# Patient Record
Sex: MALE | Race: WHITE
[De-identification: names, ages, dates, MRNs, and addresses within clinical notes are randomized per-mention and may not be internally consistent; named-entity substitution may affect disease eponyms.]

---

## 2018-07-13 NOTE — EDPHYS
Physician Documentation                                                                           

 [facility]                                                                

Name: Narayan Lutz                                                                              

Age: 23 yrs                                                                                       

Sex: Male                                                                                         

: 1995                                                                                   

MRN: [id number]                                                                                   

Arrival Date: 2018                                                                          

Time: 16:33                                                                                       

Account#: [id number]                                                                            

Bed 20                                                                                            

Private MD: Out, [facility]                                                                    

ED Physician Josiah Crain                                                                       

HPI:                                                                                              

                                                                                             

16:52 This 23 yrs old  Male presents to ER via Ambulatory with complaints of Swollen kav 

      Glands, Hand Swelling.                                                                      

17:06 The patient presents with sore throat, dysphagia, of solids. The patient describes      kav 

      throat pain as burning. Onset: The symptoms/episode began/occurred acutely, 2 day(s)        

      ago. The patient or guardian reports pain, swelling. The complaints affect the PIP of       

      left index finger. Context: The problem was sustained at home, resulted from an unknown     

      cause. Severity of symptoms: At their worst the symptoms were moderate, just prior to       

      arrival, in the emergency department the symptoms a " 6" out of "10". Associated signs      

      and symptoms:. patient presents with swollen, warm, erythema left hand of the PIP left      

      index finger. he reports that his friends have been squeezing it trying to expel            

      pustular discharge. this area is very tender to touch. he also c/o of fever/chills,         

      sore throat and swollen lymph node left cervical and reports that he has been unable to     

      swallow solid foods for the past 2 days owing to sore throat.                               

                                                                                                  

Historical:                                                                                       

- Allergies:                                                                                      

16:50 No Known Allergies;                                                                     sg  

- Home Meds:                                                                                      

16:50 None [Active];                                                                          sg  

- PMHx:                                                                                           

16:50 None;                                                                                   sg  

- PSHx:                                                                                           

16:50 Tonsillectomy;                                                                          sg  

                                                                                                  

- Immunization history:: Adult Immunizations up to date, Last tetanus immunization: up            

  to date.                                                                                        

- Social history:: Smoking status: Patient uses tobacco products, denies chronic                  

  smoking, but will smoke occasionally.                                                           

- Ebola Screening: : Patient negative for fever greater than or equal to 101.5 degrees            

  Fahrenheit, and additional compatible Ebola Virus Disease symptoms Patient denies               

  exposure to infectious person Patient denies travel to an Ebola-affected area in the            

  21 days before illness onset No symptoms or risks identified at this time.                      

- Family history:: not pertinent.                                                                 

- Hospitalizations: : No recent hospitalization is reported.                                      

- History obtained from: friend.                                                                  

                                                                                                  

                                                                                                  

ROS:                                                                                              

17:13 Eyes: Negative for injury, pain, redness, and discharge, Cardiovascular: Negative for   kav 

      chest pain, palpitations, and edema, Respiratory: Negative for shortness of breath,         

      cough, wheezing, and pleuritic chest pain, Abdomen/GI: Negative for abdominal pain,         

      nausea, vomiting, diarrhea, and constipation, Back: Negative for injury and pain, :       

      Negative for injury, bleeding, discharge, and swelling, MS/Extremity: Negative for          

      injury and deformity, Neuro: Negative for headache, weakness, numbness, tingling, and       

      seizure, Psych: Negative for depression, anxiety, suicide ideation, homicidal ideation,     

      and hallucinations, Allergy/Immunology: Negative for hives, rash, and allergies,            

      Endocrine: Negative for neck swelling, polydipsia, polyuria, polyphagia, and marked         

      weight changes, Hematologic/Lymphatic: Negative for swollen nodes, abnormal bleeding,       

      and unusual bruising.                                                                       

17:13 Constitutional: Positive for chills, fever, poor PO intake, Negative for body aches,        

      weight loss.                                                                                

17:13 ENT: Positive for of the left aspect of posterior pharynx, sinus congestion, sore           

      throat.                                                                                     

17:13 Skin: Positive for abscess, cellulitis, erythema, swelling, of the PIP of left index        

      finger.                                                                                     

                                                                                                  

Exam:                                                                                             

17:13 Head/Face:  Normocephalic, atraumatic. Eyes:  Pupils equal round and reactive to light, kav 

      extra-ocular motions intact.  Lids and lashes normal.  Conjunctiva and sclera are           

      non-icteric and not injected.  Cornea within normal limits.  Periorbital areas with no      

      swelling, redness, or edema. Chest/axilla:  Normal chest wall appearance and motion.        

      Nontender with no deformity.  No lesions are appreciated. Cardiovascular:  Regular rate     

      and rhythm with a normal S1 and S2.  No gallops, murmurs, or rubs.  Normal PMI, no JVD.     

       No pulse deficits. Respiratory:  Lungs have equal breath sounds bilaterally, clear to      

      auscultation and percussion.  No rales, rhonchi or wheezes noted.  No increased work of     

      breathing, no retractions or nasal flaring. Abdomen/GI:  Soft, non-tender, with normal      

      bowel sounds.  No distension or tympany.  No guarding or rebound.  No evidence of           

      tenderness throughout. Back:  No spinal tenderness.  No costovertebral tenderness.          

      Full range of motion. MS/ Extremity:  Pulses equal, no cyanosis.  Neurovascular intact.     

       Full, normal range of motion. Neuro:  Awake and alert, GCS 15, oriented to person,         

      place, time, and situation.  Cranial nerves II-XII grossly intact.  Motor strength 5/5      

      in all extremities.  Sensory grossly intact.  Cerebellar exam normal.  Normal gait.         

      Psych:  Awake, alert, with orientation to person, place and time.  Behavior, mood, and      

      affect are within normal limits.                                                            

17:13 Constitutional: The patient appears in no acute distress, alert, awake,                     

      non-diaphoretic, non-toxic, well developed, well hydrated, well groomed, well               

      nourished, febrile, in obvious distress, uncomfortable.                                     

17:13 ENT: Posterior pharynx: erythema, that is moderate, blisters.                               

17:13 Neck: Lymph nodes: lymphadenopathy is appreciated, posterior cervical nodes, left.          

17:13 Skin: abscess, that is moderate sized, approximately  2 cm(s), with surrounding             

      cellulitis, that is moderate, cellulitis, that is moderate, well demarcated, on the         

      PIP of left index finger.                                                                   

                                                                                                  

Vital Signs:                                                                                      

16:50  / 100; Pulse 102; Resp 17; Temp 99.8; Pulse Ox 99% on R/A; Weight 61.23 kg (R);  sg  

      Pain 8/10;                                                                                  

18:23  / 61; Pulse 76; Resp 16; Pulse Ox 99% on R/A; Pain 3/10;                         em  

                                                                                                  

Procedures:                                                                                       

17:13 I \T\ D: Incision and drainage was performed for an abscess of the left PIP of left index kav

      finger Prepped with Betadine, Anesthetized with 3 ml's 1% Lidocaine. Incised with #11       

      blade. Drained small amount Dressing: non-Adherent dressing, the patient tolerated the      

      procedure well.                                                                             

                                                                                                  

MDM:                                                                                              

17:05 Medical screening is not applicable.                                                    [facility] 

17:13 Data reviewed: vital signs, nurses notes, lab test result(s).                            

18:05 Data reviewed: lab test result(s), strep positive.                                      [facility] 

                                                                                                  

                                                                                             

17:05 Order name: Strep: left side of throat; Complete Time: 18:02                            [facility] 

                                                                                             

17:05 Order name: CBC with Diff; Complete Time: 18:02                                         [facility] 

                                                                                             

17:05 Order name: CMP; Complete Time: 18:04                                                   [facility] 

                                                                                             

17:19 Order name: Wound Culture                                                               kav 

                                                                                             

17:05 Order name: I\T\D Setup; Complete Time: 18:38                                             kav

                                                                                                  

Administered Medications:                                                                         

17:38 Drug: Rocephin 1 grams Route: IV; Rate: bolus; Site: right antecubital;                 aa5 

18:24 Follow up: Response: No adverse reaction; IV Status: Completed infusion; IV Intake: 10mlem  

17:38 Drug: TORadol 30 mg Route: IVP; Site: right antecubital;                                aa5 

18:24 Follow up: Response: No adverse reaction; Pain is decreased                             em  

17:44 Drug: NS 0.9% 1000 ml Route: IV; Rate: 1 bolus; Site: right antecubital;                em  

17:55 Drug: Lidocaine (1 %) 5 mg {Note: administered by ESTELLA Bauman} Route: Infiltration;      em  

      Site: wound;                                                                                

18:24 Follow up: Response: No adverse reaction                                                em  

                                                                                                  

                                                                                                  

Disposition:                                                                                      

18 18:06 Discharged to Home. Impression: Oral Aphthous Ulcers, Cellulitis and Abscess of    

  Left Hand PIP Index Finger, Streptococcal pharyngitis.                                          

- Condition is Stable.                                                                            

- Discharge Instructions: Pharyngitis, Strep Throat, Abscess, Easy-to-Read, Cellulitis,           

  Easy-to-Read.                                                                                   

- Prescriptions for mupirocin 2 % Topical ointment - apply 1 application by TOPICAL               

  route 3 times per day; 1 tube. Amoxicillin 875 mg Oral Tablet - take 1 tablet by ORAL           

  route every 12 hours for 10 days; 20 tablet. Ibuprofen 800 mg Oral Tablet - take 1              

  tablet by ORAL route every 8 hours As needed take with food; 30 tablet. Tylenol-                

  Codeine #3 300-30 mg Oral Tablet - take 2 tablet by ORAL route every 6 hours As                 

  needed; 30 tablet. Doxycycline Hyclate 100 mg Oral Tablet - take 1 tablet by ORAL               

  route every 12 hours; 20 tablet.                                                                

- Work release form, Medication Reconciliation Form, Thank You Letter, Antibiotic                 

  Education, Prescription Opioid Use form.                                                        

- Follow up: Private Physician; When: 2 - 3 days; Reason: Recheck today's complaints,             

  Continuance of care, Re-evaluation by your physician.                                           

- Problem is new.                                                                                 

- Symptoms have improved.                                                                         

                                                                                                  

                                                                                                  

                                                                                                  

Addendum:                                                                                         

07/15/2018                                                                                        

     20:35 Co-signature as Attending Physician, Josiah Crain MD I agree with the assessment and   k
dr

           plan of care.                                                                          

                                                                                                  

Signatures:                                                                                       

Dispatcher [employer]                           Tab Montanez, RN                         RN   sg                                                   

Josiah Crain MD MD kdr Vern, Katherine, FNP                    FNP  Richmond Carter, LVN                       LVN  Kristin Moon, RN                     RN   aa5                                                  

                                                                                                  

Corrections: (The following items were deleted from the chart)                                    

                                                                                             

18:46 18:06 2018 18:06 Discharged to Home. Impression: Oral Aphthous Ulcers; Cellulitis em  

      and Abscess of Left Hand PIP Index Finger; Streptococcal pharyngitis. Condition is          

      Stable. Discharge Instructions: Abscess, Easy-to-Read, Cellulitis, Easy-to-Read.            

      Prescriptions for mupirocin 2 % Topical ointment - apply 1 application by TOPICAL route     

      3 times per day; 1 tube, Amoxicillin 875 mg Oral Tablet - take 1 tablet by ORAL route       

      every 12 hours for 10 days; 20 tablet, Ibuprofen 800 mg Oral Tablet - take 1 tablet by      

      ORAL route every 8 hours As needed take with food; 30 tablet, Tylenol-Codeine #3 300-30     

      mg Oral Tablet - take 2 tablet by ORAL route every 6 hours As needed; 30 tablet,            

      Doxycycline Hyclate 100 mg Oral Tablet - take 1 tablet by ORAL route every 12 hours; 20     

      tablet. and Forms are Medication Reconciliation Form, Thank You Letter, Antibiotic          

      Education, Prescription Opioid Use. Follow up: Private Physician; When: 2 - 3 days;         

      Reason: Recheck today's complaints, Continuance of care, Re-evaluation by your              

      physician. Problem is new. Symptoms have improved. kav                                      

                                                                                                  

**************************************************************************************************

## 2018-07-13 NOTE — ER
Nurse's Notes                                                                                     

 [facility]                                                                

Name: Narayan Lutz                                                                              

Age: 23 yrs                                                                                       

Sex: Male                                                                                         

: 1995                                                                                   

MRN: [id number]                                                                                   

Arrival Date: 2018                                                                          

Time: 16:33                                                                                       

Account#: [id number]                                                                            

Bed 20                                                                                            

Private MD: Out, [facility]                                                                    

Diagnosis: Oral Aphthous Ulcers;Cellulitis and Abscess of Left Hand PIP Index Finger;Streptococcal

  pharyngitis                                                                                     

                                                                                                  

Presentation:                                                                                     

                                                                                             

16:48 Presenting complaint: Patient states: Left index finger swelling and redness, a wound   sg  

      noted, pt has lanced several days ago, reports swelling to the Left side of neck,           

      reports having a tonsillectomy several years ago. Transition of care: patient was not       

      received from another setting of care. Onset of symptoms was 2018. Risk            

      Assessment: Do you want to hurt yourself or someone else? Patient reports no desire to      

      harm self or others. Initial Sepsis Screen: Does the patient meet any 2 criteria? HR >      

      90 bpm. Does the patient have a suspected source of infection? Yes: Skin                    

      breakdown/wound. Care prior to arrival: None.                                               

16:48 Method Of Arrival: Ambulatory                                                           sg  

16:48 Acuity: AGA 3                                                                           sg  

                                                                                                  

Historical:                                                                                       

- Allergies:                                                                                      

16:50 No Known Allergies;                                                                     sg  

- Home Meds:                                                                                      

16:50 None [Active];                                                                          sg  

- PMHx:                                                                                           

16:50 None;                                                                                   sg  

- PSHx:                                                                                           

16:50 Tonsillectomy;                                                                          sg  

                                                                                                  

- Immunization history:: Adult Immunizations up to date, Last tetanus immunization: up            

  to date.                                                                                        

- Social history:: Smoking status: Patient uses tobacco products, denies chronic                  

  smoking, but will smoke occasionally.                                                           

- Ebola Screening: : Patient negative for fever greater than or equal to 101.5 degrees            

  Fahrenheit, and additional compatible Ebola Virus Disease symptoms Patient denies               

  exposure to infectious person Patient denies travel to an Ebola-affected area in the            

  21 days before illness onset No symptoms or risks identified at this time.                      

- Family history:: not pertinent.                                                                 

- Hospitalizations: : No recent hospitalization is reported.                                      

- History obtained from: friend.                                                                  

                                                                                                  

                                                                                                  

Screenin:10 Abuse screen: Denies threats or abuse. Nutritional screening: No deficits noted.        em  

      Tuberculosis screening: No symptoms or risk factors identified. Fall Risk None              

      identified.                                                                                 

                                                                                                  

Assessment:                                                                                       

17:30 General: Appears in no apparent distress. uncomfortable, Behavior is calm, cooperative, em  

      Reports sore throat for 2 days, swelling noted to tonsils, reports hand swelling for 1      

      week, left hand swelling noted. Pain: Complains of pain in left aspect of posterior         

      pharynx and PIP of left index finger Pain currently is 8 out of 10 on a pain scale.         

      Neuro: Level of Consciousness is awake, alert, obeys commands, Oriented to person,          

      place, time, situation. Cardiovascular: Capillary refill < 3 seconds Patient's skin is      

      warm and dry. Respiratory: Airway is patent Respiratory effort is even, unlabored,          

      Respiratory pattern is regular, symmetrical. GI: Abdomen is flat, Patient currently         

      denies nausea, vomiting. : No signs and/or symptoms were reported regarding the           

      genitourinary system. EENT: Throat is reddened has enlarged tonsils bilaterally with        

      gag reflex absent, Reports difficulty swallowing since this morning. Derm: Skin is          

      intact, Abscess located on PIP of left index finger is quarter sized. Musculoskeletal:      

      Range of motion: intact in all extremities.                                                 

17:30 Reassessment: I agree with assessment completed by NIRMAL Fragoso .                         aa5 

18:37 Reassessment: Patient appears in no apparent distress at this time. Patient and/or      em  

      family updated on plan of care and expected duration. Pain level reassessed. Patient is     

      alert, oriented x 3, equal unlabored respirations, skin warm/dry/pink. Patient states       

      feeling better. Patient states symptoms have improved.                                      

                                                                                                  

Vital Signs:                                                                                      

16:50  / 100; Pulse 102; Resp 17; Temp 99.8; Pulse Ox 99% on R/A; Weight 61.23 kg (R);  sg  

      Pain 8/10;                                                                                  

18:23  / 61; Pulse 76; Resp 16; Pulse Ox 99% on R/A; Pain 3/10;                         em  

                                                                                                  

ED Course:                                                                                        

16:33 Patient arrived in ED.                                                                  sb2 

16:34 Out, of Town is Private Physician.                                                      sb2 

16:49 Triage completed.                                                                       sg  

16:50 Arm band placed on.                                                                     sg  

16:52 [facility]                                                           kav 

16:52 Josiah Crain MD is Attending Physician.                                              kav 

17:02 Patient has correct armband on for positive identification. Bed in low position. Call   em  

      light in reach. Adult w/ patient.                                                           

17:16 Chahal, [place], LVN is Primary Nurse.                                                     em  

17:30 No provider procedures requiring assistance completed. Inserted saline lock: 20 gauge   em  

      in right antecubital area, using aseptic technique. Blood collected.                        

17:30 Initial lab(s) drawn, by me, sent to lab.                                               em  

18:44 IV discontinued, intact, bleeding controlled, No redness/swelling at site. Pressure     em  

      dressing applied.                                                                           

                                                                                                  

Administered Medications:                                                                         

17:38 Drug: Rocephin 1 grams Route: IV; Rate: bolus; Site: right antecubital;                 aa5 

18:24 Follow up: Response: No adverse reaction; IV Status: Completed infusion; IV Intake: 10mlem  

17:38 Drug: TORadol 30 mg Route: IVP; Site: right antecubital;                                aa5 

18:24 Follow up: Response: No adverse reaction; Pain is decreased                             em  

17:44 Drug: NS 0.9% 1000 ml Route: IV; Rate: 1 bolus; Site: right antecubital;                em  

17:55 Drug: Lidocaine (1 %) 5 mg {Note: administered by BUDDY Bauman.} Route: Infiltration;      em  

      Site: wound;                                                                                

18:24 Follow up: Response: No adverse reaction                                                em  

                                                                                                  

                                                                                                  

Intake:                                                                                           

18:24 IV: 10ml; Total: 10ml.                                                                  em  

                                                                                                  

Outcome:                                                                                          

18:06 Discharge ordered by MD.                                                                ka 

18:44 Discharged to home ambulatory, with friend.                                             em  

18:44 Condition: good                                                                             

18:44 Discharge instructions given to patient, Instructed on discharge instructions, follow       

      up and referral plans. no drinking with medication, no driving heavy equipment,             

      medication usage, wound care, Demonstrated understanding of instructions, follow-up         

      care, medications, Prescriptions given X 5                                                  

18:46 Patient left the ED.                                                                    em  

                                                                                                  

Addendum:                                                                                         

2018                                                                                        

     08:23 Addendum: Culture Results: Positive wound culture. No further action required. Bacteria i
w

           sensitive to prescribed antibiotic.                                                    

                                                                                                  

Signatures:                                                                                       

Tab Carmen RN                         RN   sg                                                   

Maegan Norton, FNP                    FNP  [place] Carter, LVN                       LVN  em                                                   

Liliana Olivo RN RN                                                      

Kristin Gonzalez RN                     RN   aa5                                                  

Joy Vasquez                               sb2                                                  

                                                                                                  

Corrections: (The following items were deleted from the chart)                                    

                                                                                             

18:44 16:30 No provider procedures requiring assistance completed. em                         em  

18:44 16:30 Inserted saline lock: 20 gauge in right antecubital area, using aseptic           em  

      technique. Blood collected. em                                                              

18:44 16:30 Initial lab(s) drawn, by me, sent to lab. em                                      em  

                                                                                                  

**************************************************************************************************

## 2019-08-13 ENCOUNTER — HOSPITAL ENCOUNTER (EMERGENCY)
Dept: HOSPITAL 97 - ER | Age: 24
Discharge: HOME | End: 2019-08-13
Payer: SELF-PAY

## 2019-08-13 DIAGNOSIS — R04.0: ICD-10-CM

## 2019-08-13 DIAGNOSIS — J01.20: Primary | ICD-10-CM

## 2019-08-13 LAB
ALBUMIN SERPL BCP-MCNC: 4.2 G/DL (ref 3.4–5)
ALP SERPL-CCNC: 58 U/L (ref 45–117)
ALT SERPL W P-5'-P-CCNC: 27 U/L (ref 12–78)
AST SERPL W P-5'-P-CCNC: 19 U/L (ref 15–37)
BUN BLD-MCNC: 10 MG/DL (ref 7–18)
GLUCOSE SERPLBLD-MCNC: 79 MG/DL (ref 74–106)
HCT VFR BLD CALC: 41.5 % (ref 39.6–49)
INR BLD: 1
LYMPHOCYTES # SPEC AUTO: 2.4 K/UL (ref 0.7–4.9)
PMV BLD: 9.1 FL (ref 7.6–11.3)
POTASSIUM SERPL-SCNC: 3.6 MMOL/L (ref 3.5–5.1)
RBC # BLD: 4.9 M/UL (ref 4.33–5.43)

## 2019-08-13 PROCEDURE — 85610 PROTHROMBIN TIME: CPT

## 2019-08-13 PROCEDURE — 80053 COMPREHEN METABOLIC PANEL: CPT

## 2019-08-13 PROCEDURE — 96361 HYDRATE IV INFUSION ADD-ON: CPT

## 2019-08-13 PROCEDURE — 96372 THER/PROPH/DIAG INJ SC/IM: CPT

## 2019-08-13 PROCEDURE — 85730 THROMBOPLASTIN TIME PARTIAL: CPT

## 2019-08-13 PROCEDURE — 81003 URINALYSIS AUTO W/O SCOPE: CPT

## 2019-08-13 PROCEDURE — 96374 THER/PROPH/DIAG INJ IV PUSH: CPT

## 2019-08-13 PROCEDURE — 70450 CT HEAD/BRAIN W/O DYE: CPT

## 2019-08-13 PROCEDURE — 85025 COMPLETE CBC W/AUTO DIFF WBC: CPT

## 2019-08-13 PROCEDURE — 36415 COLL VENOUS BLD VENIPUNCTURE: CPT

## 2019-08-13 PROCEDURE — 99284 EMERGENCY DEPT VISIT MOD MDM: CPT

## 2019-08-13 NOTE — RAD REPORT
EXAM DESCRIPTION:  CT - Head Brain Wo Cont - 8/13/2019 7:55 pm

 

CLINICAL HISTORY:  HEADACHE

 

COMPARISON:  No comparisons

 

TECHNIQUE:  All CT scans are performed using dose optimization technique as appropriate and may inclu
de automated exposure control or mA/KV adjustment according to patient size.

 

FINDINGS:  No intracranial hemorrhage, hydrocephalus or extra-axial fluid collection.No areas of brai
n edema or evidence of midline shift.

 

Ethmoid air cell opacification is noted compatible with mild sinusitis. The calvarium is intact.

 

IMPRESSION:  No acute intracranial abnormality.

## 2019-08-13 NOTE — ER
Nurse's Notes                                                                                     

 [facility]                                                                 

Name: Narayan Lutz                                                                              

Age: 24 yrs                                                                                       

Sex: Male                                                                                         

: 1995                                                                                   

MRN: [id number]                                                                                   

Arrival Date: 2019                                                                          

Time: 18:29                                                                                       

Account#: [id number]                                                                            

Bed 13                                                                                            

Private MD:                                                                                       

Diagnosis: Headache;Epistaxis;Acute ethmoidal sinusitis                                           

                                                                                                  

Presentation:                                                                                     

                                                                                             

18:42 Presenting complaint: Patient states: I have had a few nose bleeds in the past few      la1 

      days, I have had a HA for the last three days, and when I work outside I get overheated     

      really easily. Transition of care: patient was not received from another setting of         

      care. Onset of symptoms was 2019. Risk Assessment: Do you want to hurt           

      yourself or someone else? Patient reports no desire to harm self or others. Initial         

      Sepsis Screen: Does the patient meet any 2 criteria? No. Patient's initial sepsis           

      screen is negative. Does the patient have a suspected source of infection? No.              

      Patient's initial sepsis screen is negative. Care prior to arrival: None.                   

18:42 Method Of Arrival: Ambulatory                                                           la1 

18:42 Acuity: AGA 3                                                                           la1 

                                                                                                  

Historical:                                                                                       

- Allergies:                                                                                      

18:43 No Known Allergies;                                                                     la1 

- PMHx:                                                                                           

18:43 None;                                                                                   la1 

                                                                                                  

- Immunization history:: Adult Immunizations up to date.                                          

- Social history:: Smoking status: Patient/guardian denies using tobacco.                         

- Ebola Screening: : No symptoms or risks identified at this time.                                

                                                                                                  

                                                                                                  

Screenin:06 Abuse screen: Denies threats or abuse. Nutritional screening: No deficits noted.        ea  

      Tuberculosis screening: No symptoms or risk factors identified. Fall Risk None              

      identified.                                                                                 

                                                                                                  

Assessment:                                                                                       

19:05 General: Appears in no apparent distress. Behavior is appropriate for age. Pain:        ea  

      Complains of pain in headache. Neuro: Level of Consciousness is awake, alert, obeys         

      commands, Oriented to person, place, time, situation. Cardiovascular: Patient's skin is     

      warm and dry. Respiratory: Airway is patent Respiratory effort is even, unlabored,          

      Respiratory pattern is regular, symmetrical. EENT: Reports nose bleeds. Derm: Skin is       

      pink, warm \T\ dry.                                                                         

20:07 Reassessment: Patient and/or family updated on plan of care and expected duration. Pain ea  

      level reassessed. Patient is alert, oriented x 3, equal unlabored respirations, skin        

      warm/dry/pink. Returned from CT.                                                            

21:30 Reassessment: Patient and/or family updated on plan of care and expected duration. Pain ea  

      level reassessed. Patient is alert, oriented x 3, equal unlabored respirations, skin        

      warm/dry/pink. Discharge instruction given to patient, verbalized the understanding of      

      instruction. Pt left with significant other, pt tolerating well.                            

                                                                                                  

Vital Signs:                                                                                      

18:44  / 72; Pulse 61; Resp 16; Temp 97.4; Pulse Ox 98% on R/A; Weight 74.84 kg; Height la1 

      5 ft. 7 in. (170.18 cm);                                                                    

19:32  / 71; Pulse 48; Resp 18; Pulse Ox 100% ;                                         ea  

20:30  / 68; Pulse 60; Resp 18; Pulse Ox 99% on R/A;                                    ea  

21:20  / 60; Pulse 60; Resp 18; Pulse Ox 99% on R/A;                                    ea  

18:44 Body Mass Index 25.84 (74.84 kg, 170.18 cm)                                             la1 

                                                                                                  

ED Course:                                                                                        

18:29 Patient arrived in ED.                                                                  as  

18:43 Triage completed.                                                                       la1 

18:44 Arm band placed on left wrist.                                                          la1 

19:01 Rojas Stark MD is Attending Physician.                                             denis 

19:03 Alma White RN is Primary Nurse.                                                    ea  

19:06 Patient has correct armband on for positive identification. Bed in low position. Call   ea  

      light in reach.                                                                             

19:54 CT completed. Patient tolerated procedure well. Patient moved to CT. Patient moved back nj  

      from CT.                                                                                    

19:55 CT Head Brain wo Cont In Process Unspecified.                                           EDMS

20:34 Gisell Ferris MD is Referral Physician.                                           denis 

21:28 No provider procedures requiring assistance completed. IV discontinued, intact,         ea  

      bleeding controlled, No redness/swelling at site. Pressure dressing applied.                

                                                                                                  

Administered Medications:                                                                         

19:29 Drug: NS 0.9% 1000 ml Route: IV; Rate: 1 bolus; Site: right antecubital;                ea  

20:00 Follow up: Response: No adverse reaction; IV Status: Completed infusion; IV Intake:     ea  

      1000ml                                                                                      

19:29 Drug: Zofran 4 mg Route: IVP; Site: right antecubital;                                  ea  

20:54 Follow up: Response: No adverse reaction; Marked relief of symptoms                     ea  

20:35 Drug: Rocephin (cefTRIAXone) 1 grams Route: IM; Site: right gluteus;                    ea  

21:32 Follow up: Response: No adverse reaction                                                ea  

21:05 Drug: Augmentin 875 mg Route: PO;                                                       ea  

21:32 Follow up: Response: No adverse reaction                                                ea  

21:06 Not Given (Other Intervention Used): Rocephin 1 grams IV at per protocol once; Given    ea  

      slow IV push per pharmacy instructions                                                      

                                                                                                  

                                                                                                  

Intake:                                                                                           

20:00 IV: 1000ml; Total: 1000ml.                                                              ea  

                                                                                                  

Outcome:                                                                                          

20:35 Discharge ordered by MD.                                                                denis 

21:28 Discharged to home ambulatory, with significant other.                                  ea  

21:28 Condition: stable                                                                           

21:28 Discharge instructions given to patient, Instructed on discharge instructions, follow       

      up and referral plans. medication usage, Demonstrated understanding of instructions,        

      follow-up care, medications, Prescriptions given X 1.                                       

21:31 Patient left the ED.                                                                    ea  

                                                                                                  

Signatures:                                                                                       

Dispatcher [employer]                           EDRojas Dodge MD MD cha Martinez, Amelia as Attema, Lee RN                         RN   laTulio Driver Elena, RN                      RN   darell                                                   

                                                                                                  

**************************************************************************************************

## 2019-10-17 ENCOUNTER — HOSPITAL ENCOUNTER (EMERGENCY)
Dept: HOSPITAL 97 - ER | Age: 24
Discharge: HOME | End: 2019-10-17
Payer: SELF-PAY

## 2019-10-17 VITALS — SYSTOLIC BLOOD PRESSURE: 115 MMHG | OXYGEN SATURATION: 100 % | TEMPERATURE: 98.4 F | DIASTOLIC BLOOD PRESSURE: 84 MMHG

## 2019-10-17 DIAGNOSIS — M54.5: Primary | ICD-10-CM

## 2019-10-17 DIAGNOSIS — F17.220: ICD-10-CM

## 2019-10-17 PROCEDURE — 99283 EMERGENCY DEPT VISIT LOW MDM: CPT

## 2019-10-17 PROCEDURE — 96372 THER/PROPH/DIAG INJ SC/IM: CPT

## 2019-10-17 NOTE — EDPHYS
Physician Documentation                                                                           

 [facility]                                                                 

Name: Narayan Lutz                                                                              

Age: 24 yrs                                                                                       

Sex: Male                                                                                         

: 1995                                                                                   

MRN: [id number]                                                                                   

Arrival Date: 10/17/2019                                                                          

Time: 12:22                                                                                       

Account#: [id number]                                                                            

Bed 6                                                                                             

Private MD:                                                                                       

ED Physician Cipriano Valdes                                                                       

HPI:                                                                                              

10/17                                                                                             

12:46 This 24 yrs old  Male presents to ER via Ambulatory with complaints of Back    kb  

      Pain.                                                                                       

12:46 The patient presents with pain that is acute, and tenderness. The symptoms are located  kb  

      in the thoracic area and lumbar area. Onset: The symptoms/episode began/occurred 4          

      day(s) ago. The pain does not radiate. Associated signs and symptoms: The patient has       

      no apparent associated signs or symptoms. The problem was sustained when lifting heavy      

      object. Modifying factors: The patient symptoms are alleviated by nothing, the patient      

      symptoms are aggravated by any movement. Severity of symptoms: At their worst the           

      symptoms were moderate, in the emergency department the symptoms are unchanged. The         

      patient has not experienced similar symptoms in the past. The patient has not recently      

      seen a physician. Pt reports he was lifting furniture at the beginning of the week and      

      had some pain in his back that has been getting a little worse. Reports he went to work     

      today and lifted a heavy pipe which caused more pain. Ambulates with steady gait. No        

      urinary or bowel symptoms. States "my work made me come to get evaluated and get a note     

      that says when I can return.".                                                              

                                                                                                  

Historical:                                                                                       

- Allergies:                                                                                      

12:35 No Known Allergies;                                                                     mg2 

- Home Meds:                                                                                      

12:35 None [Active];                                                                          mg2 

- PMHx:                                                                                           

12:35 None;                                                                                   mg2 

- PSHx:                                                                                           

12:35 None;                                                                                   mg2 

                                                                                                  

- Immunization history:: Flu vaccine is up to date.                                               

- Social history:: Smoking status: Patient uses tobacco products, uses dip, Patient               

  uses alcohol, only on a social basis. Patient/guardian denies using street drugs, IV            

  drugs.                                                                                          

- Ebola Screening: : No symptoms or risks identified at this time.                                

                                                                                                  

                                                                                                  

ROS:                                                                                              

12:45 Constitutional: Negative for fever, chills, and weight loss, ENT: Negative for injury,  kb  

      pain, and discharge, Neck: Negative for injury, pain, and swelling, Cardiovascular:         

      Negative for chest pain, palpitations, and edema, Respiratory: Negative for shortness       

      of breath, cough, wheezing, and pleuritic chest pain, Abdomen/GI: Negative for              

      abdominal pain, nausea, vomiting, diarrhea, and constipation, : Negative for injury,      

      bleeding, discharge, and swelling, MS/Extremity: Negative for injury and deformity,         

      Skin: Negative for injury, rash, and discoloration, Neuro: Negative for headache,           

      weakness, numbness, tingling, and seizure.                                                  

12:45 Back: Positive for pain at rest, pain with movement, of the thoracic area and lumbar        

      area.                                                                                       

                                                                                                  

Exam:                                                                                             

12:45 Constitutional:  This is a well developed, well nourished patient who is awake, alert,  kb  

      and in no acute distress. Head/Face:  Normocephalic, atraumatic. ENT:  Nares patent. No     

      nasal discharge, no septal abnormalities noted.  Tympanic membranes are normal and          

      external auditory canals are clear.  Oropharynx with no redness, swelling, or masses,       

      exudates, or evidence of obstruction, uvula midline.  Mucous membranes moist. Neck:         

      Trachea midline, no thyromegaly or masses palpated, and no cervical lymphadenopathy.        

      Supple, full range of motion without nuchal rigidity, or vertebral point tenderness.        

      No Meningismus. Chest/axilla:  Normal chest wall appearance and motion.  Nontender with     

      no deformity.  No lesions are appreciated. Cardiovascular:  Regular rate and rhythm         

      with a normal S1 and S2.  No gallops, murmurs, or rubs.  Normal PMI, no JVD.  No pulse      

      deficits. Respiratory:  Lungs have equal breath sounds bilaterally, clear to                

      auscultation and percussion.  No rales, rhonchi or wheezes noted.  No increased work of     

      breathing, no retractions or nasal flaring. Abdomen/GI:  Soft, non-tender, with normal      

      bowel sounds.  No distension or tympany.  No guarding or rebound.  No evidence of           

      tenderness throughout. Skin:  Warm, dry with normal turgor.  Normal color with no           

      rashes, no lesions, and no evidence of cellulitis. MS/ Extremity:  Pulses equal, no         

      cyanosis.  Neurovascular intact.  Full, normal range of motion. Neuro:  Awake and           

      alert, GCS 15, oriented to person, place, time, and situation.  Cranial nerves II-XII       

      grossly intact.  Motor strength 5/5 in all extremities.  Sensory grossly intact.            

      Cerebellar exam normal.  Normal gait.                                                       

12:45 Back: pain, that is mild, that is moderate, of the  thoracic area and lumbar area, ROM      

      is painful, with all movement, normal spinal alignment noted.                               

                                                                                                  

Vital Signs:                                                                                      

12:34  / 84; Pulse 66; Resp 18; Temp 98.4; Pulse Ox 100% on R/A; Weight 75.75 kg;       mg2 

      Height 5 ft. 7 in. (170.18 cm); Pain 8/10;                                                  

12:34 Body Mass Index 26.16 (75.75 kg, 170.18 cm)                                             mg2 

                                                                                                  

MDM:                                                                                              

12:29 Patient medically screened.                                                             kb  

12:45 Data reviewed: vital signs, nurses notes. Data interpreted: Pulse oximetry: on room air kb  

      is 100 %. Interpretation: normal. Counseling: I had a detailed discussion with the          

      patient and/or guardian regarding: the historical points, exam findings, and any            

      diagnostic results supporting the discharge/admit diagnosis, the need for outpatient        

      follow up, a family practitioner, to return to the emergency department if symptoms         

      worsen or persist or if there are any questions or concerns that arise at home.             

                                                                                                  

Administered Medications:                                                                         

12:53 Drug: Norco 10 mg-325 mg 1 tabs Route: PO;                                              mg2 

13:24 Follow up: Response: No adverse reaction; Medication administered at discharge.         mg2 

12:53 Drug: TORadol 30 mg Route: IM; Site: right gluteus;                                     mg2 

13:24 Follow up: Response: No adverse reaction; Medication administered at discharge.         mg2 

                                                                                                  

                                                                                                  

Disposition:                                                                                      

10/17/19 12:49 Discharged to Home. Impression: Low back pain.                                     

- Condition is Stable.                                                                            

- Discharge Instructions: Back Injury Prevention, Easy-to-Read, Back Pain, Adult,                 

  Easy-to-Read.                                                                                   

- Prescriptions for Skelaxin 800 mg Oral Tablet - take 1 tablet by ORAL route every 8             

  hours As needed; 21 tablet. Diclofenac Sodium 75 mg Oral Tablet, Delayed Release                

  (E.C.) - take 1 tablet by ORAL route 2 times per day As needed; 30 tablet.                      

- Medication Reconciliation Form, Thank You Letter, Antibiotic Education, Prescription            

  Opioid Use, Work release form form.                                                             

- Follow up: Emergency Department; When: As needed; Reason: Worsening of condition.               

  Follow up: Private Physician; When: 2 - 3 days; Reason: Recheck today's complaints,             

  Continuance of care, Re-evaluation by your physician.                                           

                                                                                                  

                                                                                                  

                                                                                                  

Addendum:                                                                                         

10/18/2019                                                                                        

     15:17 Co-signature as Attending Physician, Cipriano Valdes MD.                                  g
s

                                                                                                  

Signatures:                                                                                       

Evette Soto FNP-C                 FNP-Liliana Damon RN                     RN   iw                                                   

Cipriano Valdse MD MD   gs                                                   

Luc Juan RN                    RN   mg2                                                  

                                                                                                  

Corrections: (The following items were deleted from the chart)                                    

10/17                                                                                             

13:23 12:49 10/17/2019 12:49 Discharged to Home. Impression: Low back pain. Condition is      iw  

      Stable. Forms are Medication Reconciliation Form, Thank You Letter, Antibiotic              

      Education, Prescription Opioid Use. Follow up: Emergency Department; When: As needed;       

      Reason: Worsening of condition. Follow up: Private Physician; When: 2 - 3 days; Reason:     

      Recheck today's complaints, Continuance of care, Re-evaluation by your physician. kb        

                                                                                                  

**************************************************************************************************

## 2019-10-17 NOTE — ER
Nurse's Notes                                                                                     

 [facility]                                                                 

Name: Narayan Lutz                                                                              

Age: 24 yrs                                                                                       

Sex: Male                                                                                         

: 1995                                                                                   

MRN: [id number]                                                                                   

Arrival Date: 10/17/2019                                                                          

Time: 12:22                                                                                       

Account#: [id number]                                                                            

Bed 6                                                                                             

Private MD:                                                                                       

Diagnosis: Low back pain                                                                          

                                                                                                  

Presentation:                                                                                     

10/17                                                                                             

12:33 Presenting complaint: Patient states: last Monday , i helped somebody in moving         mg2 

      furnitures and today i think i messed my back while lifting a pipe at work. Transition      

      of care: patient was not received from another setting of care. Onset of symptoms was       

      2019. Risk Assessment: Do you want to hurt yourself or someone else? Patient        

      reports no desire to harm self or others. Initial Sepsis Screen: Does the patient meet      

      any 2 criteria? No. Patient's initial sepsis screen is negative. Does the patient have      

      a suspected source of infection? No. Patient's initial sepsis screen is negative. Care      

      prior to arrival: None.                                                                     

12:33 Method Of Arrival: Ambulatory                                                           mg2 

12:33 Acuity: AGA 4                                                                           mg2 

                                                                                                  

Triage Assessment:                                                                                

13:15 General: Appears in no apparent distress. comfortable, Behavior is calm, cooperative.   mg2 

13:15 Musculoskeletal: Circulation, motion, and sensation intact. Capillary refill < 3        mg2 

      seconds.                                                                                    

                                                                                                  

Historical:                                                                                       

- Allergies:                                                                                      

12:35 No Known Allergies;                                                                     mg2 

- Home Meds:                                                                                      

12:35 None [Active];                                                                          mg2 

- PMHx:                                                                                           

12:35 None;                                                                                   mg2 

- PSHx:                                                                                           

12:35 None;                                                                                   mg2 

                                                                                                  

- Immunization history:: Flu vaccine is up to date.                                               

- Social history:: Smoking status: Patient uses tobacco products, uses dip, Patient               

  uses alcohol, only on a social basis. Patient/guardian denies using street drugs, IV            

  drugs.                                                                                          

- Ebola Screening: : No symptoms or risks identified at this time.                                

                                                                                                  

                                                                                                  

Screenin:15 Abuse screen: Denies threats or abuse. Denies injuries from another. Nutritional        mg2 

      screening: No deficits noted. Nutritional screening: No deficits noted. Tuberculosis        

      screening: Tuberculosis screening: Never had TB.                                            

13:15 Fall Risk None identified.                                                              mg2 

                                                                                                  

Assessment:                                                                                       

13:15 General: Appears in no apparent distress. comfortable, Behavior is calm, cooperative.   mg2 

      Pain: Complains of pain in lumbar area Pain does not radiate. Pain currently is 7 out       

      of 10 on a pain scale. Quality of pain is described as aching, Pain began 30 min ago.       

      2-3 days ago. Is intermittent, Alleviated by heat application, cold application. Neuro:     

      Level of Consciousness is awake, alert, obeys commands, Oriented to person, place,          

      time, situation. Cardiovascular: Capillary refill < 3 seconds Patient's skin is warm        

      and dry. Respiratory: Airway is patent Respiratory effort is even, unlabored,               

      Respiratory pattern is regular, symmetrical. GI: No signs and/or symptoms were reported     

      involving the gastrointestinal system. : No signs and/or symptoms were reported           

      regarding the genitourinary system. EENT: Derm: Skin is intact, is healthy with good        

      turgor, Skin is pink, warm \T\ dry. normal.                                                 

13:15 Musculoskeletal: Circulation, motion, and sensation intact. Capillary refill < 3        mg2 

      seconds, Reports pain in lumbar area.                                                       

                                                                                                  

Vital Signs:                                                                                      

12:34  / 84; Pulse 66; Resp 18; Temp 98.4; Pulse Ox 100% on R/A; Weight 75.75 kg;       mg2 

      Height 5 ft. 7 in. (170.18 cm); Pain 8/10;                                                  

12:34 Body Mass Index 26.16 (75.75 kg, 170.18 cm)                                             mg2 

                                                                                                  

ED Course:                                                                                        

12:22 Patient arrived in ED.                                                                  mr  

12:29 [facility]                                                        kb  

12:29 Cipriano Valdes MD is Attending Physician.                                              kb  

12:32 Luc Juan, ANGELITA is Primary Nurse.                                                  mg2 

12:34 Triage completed.                                                                       mg2 

12:35 Arm band placed on.                                                                     mg2 

13:15 Patient has correct armband on for positive identification.                             mg2 

13:15 No provider procedures requiring assistance completed.                                  mg2 

13:15 Patient did not have IV access during this emergency room visit.                        mg2 

                                                                                                  

Administered Medications:                                                                         

12:53 Drug: Norco 10 mg-325 mg 1 tabs Route: PO;                                              mg2 

13:24 Follow up: Response: No adverse reaction; Medication administered at discharge.         mg2 

12:53 Drug: TORadol 30 mg Route: IM; Site: right gluteus;                                     mg2 

13:24 Follow up: Response: No adverse reaction; Medication administered at discharge.         mg2 

                                                                                                  

                                                                                                  

Outcome:                                                                                          

12:49 Discharge ordered by MD.                                                                kb  

13:23 Patient left the ED.                                                                    iw  

13:24 Discharged to home ambulatory, with family.                                             mg2 

13:24 Condition: stable                                                                           

13:24 Discharge instructions given to patient, family, Instructed on discharge instructions,      

      follow up and referral plans. medication usage, Demonstrated understanding of               

      instructions, follow-up care, medications, Prescriptions given X 2.                         

                                                                                                  

Signatures:                                                                                       

Evette Soto, SONYA SUMNER-Yuridia Almaguer Irene, RN                     RN   iw                                                   

Lcu Juan RN RN   mg2                                                  

                                                                                                  

**************************************************************************************************

## 2020-08-18 ENCOUNTER — HOSPITAL ENCOUNTER (EMERGENCY)
Dept: HOSPITAL 97 - ER | Age: 25
Discharge: HOME | End: 2020-08-18
Payer: SELF-PAY

## 2020-08-18 VITALS — TEMPERATURE: 98.1 F | SYSTOLIC BLOOD PRESSURE: 137 MMHG | DIASTOLIC BLOOD PRESSURE: 87 MMHG | OXYGEN SATURATION: 98 %

## 2020-08-18 DIAGNOSIS — L02.213: Primary | ICD-10-CM

## 2020-08-18 PROCEDURE — 0J960ZZ DRAINAGE OF CHEST SUBCUTANEOUS TISSUE AND FASCIA, OPEN APPROACH: ICD-10-PCS

## 2020-08-18 NOTE — EDPHYS
Physician Documentation                                                                           

 [facility]                                                                 

Name: Narayan Lutz                                                                              

Age: 25 yrs                                                                                       

Sex: Male                                                                                         

: 1995                                                                                   

MRN: [id number]                                                                                   

Arrival Date: 2020                                                                          

Time: 21:44                                                                                       

Account#: [id number]                                                                            

Bed 24                                                                                            

Private MD:                                                                                       

ED Physician William Mendez                                                                             

HPI:                                                                                              

                                                                                             

22:03 This 25 yrs old  Male presents to ER via Ambulatory with complaints of         jr8 

      Infected spot on chest.                                                                     

22:03 the patient presents with a swollen area of the anterior aspect of left upper chest.    jr8 

      Description: The affected area is small, localized, erythematous, raised. Onset: The        

      symptoms/episode began/occurred gradually, 2 day(s) ago. Possible cause(s): unknown.        

      Associated signs and symptoms: The patient has no apparent associated signs or              

      symptoms. Modifying factors: the symptoms are alleviated by nothing, the symptoms are       

      aggravated by pressure, sitting, squeezing the lesion and expressing the contents,          

      touching. Severity of symptoms: At their worst the symptoms were mild, in the emergency     

      department the symptoms are unchanged. The patient has not experienced similar symptoms     

      in the past. The patient has not recently seen a physician.                                 

                                                                                                  

Historical:                                                                                       

- Allergies:                                                                                      

21:50 No Known Allergies;                                                                     ca1 

- Home Meds:                                                                                      

21:50 None [Active];                                                                          ca1 

- PMHx:                                                                                           

21:50 None;                                                                                   ca1 

- PSHx:                                                                                           

21:50 None;                                                                                   ca1 

                                                                                                  

- Immunization history:: Adult Immunizations up to date, Last tetanus immunization:               

  unknown.                                                                                        

- Social history:: Smoking status: Patient denies any tobacco usage or history of.                

                                                                                                  

                                                                                                  

ROS:                                                                                              

22:03 Eyes: Negative for injury, pain, redness, and discharge, ENT: Negative for injury,      jr8 

      pain, and discharge, Neck: Negative for injury, pain, and swelling, Cardiovascular:         

      Negative for chest pain, palpitations, and edema, Respiratory: Negative for shortness       

      of breath, cough, wheezing, and pleuritic chest pain, Abdomen/GI: Negative for              

      abdominal pain, nausea, vomiting, diarrhea, and constipation, Back: Negative for injury     

      and pain, MS/Extremity: Negative for injury and deformity, Neuro: Negative for              

      headache, weakness, numbness, tingling, and seizure.                                        

22:03 Skin: Positive for abscess, erythema, of the anterior aspect of left upper chest.           

                                                                                                  

Exam:                                                                                             

22:03 Eyes:  Pupils equal round and reactive to light, extra-ocular motions intact.  Lids and jr8 

      lashes normal.  Conjunctiva and sclera are non-icteric and not injected.  Cornea within     

      normal limits.  Periorbital areas with no swelling, redness, or edema. ENT:  Nares          

      patent. No nasal discharge, no septal abnormalities noted.  Tympanic membranes are          

      normal and external auditory canals are clear.  Oropharynx with no redness, swelling,       

      or masses, exudates, or evidence of obstruction, uvula midline.  Mucous membranes           

      moist. Neck:  Trachea midline, no thyromegaly or masses palpated, and no cervical           

      lymphadenopathy.  Supple, full range of motion without nuchal rigidity, or vertebral        

      point tenderness.  No Meningismus. Cardiovascular:  Regular rate and rhythm with a          

      normal S1 and S2.  No gallops, murmurs, or rubs.  Normal PMI, no JVD.  No pulse             

      deficits. Respiratory:  Lungs have equal breath sounds bilaterally, clear to                

      auscultation and percussion.  No rales, rhonchi or wheezes noted.  No increased work of     

      breathing, no retractions or nasal flaring. Abdomen/GI:  Soft, non-tender, with normal      

      bowel sounds.  No distension or tympany.  No guarding or rebound.  No evidence of           

      tenderness throughout. Back:  No spinal tenderness.  No costovertebral tenderness.          

      Full range of motion. MS/ Extremity:  Pulses equal, no cyanosis.  Neurovascular intact.     

       Full, normal range of motion. Neuro:  Awake and alert, GCS 15, oriented to person,         

      place, time, and situation.  Cranial nerves II-XII grossly intact.  Motor strength 5/5      

      in all extremities.  Sensory grossly intact.  Cerebellar exam normal.  Normal gait.         

22:03 Skin: abscess, that is small, approximately  2.5 cm(s), of the anterior aspect of left      

      upper chest, with induration, with pointing.                                                

                                                                                                  

Vital Signs:                                                                                      

21:49  / 87; Pulse 84; Resp 15 S; Temp 98.1(TE); Pulse Ox 98% on R/A; Weight 75.3 kg    ca1 

      (R); Height 5 ft. 7 in. (170.18 cm) (R); Pain 6/10;                                         

21:49 Body Mass Index 26.00 (75.30 kg, 170.18 cm)                                             ca1 

                                                                                                  

Procedures:                                                                                       

22:35 I \T\ D: Incision and drainage was performed for an abscess of the anterior aspect of     jr8

      left upper chest Prepped with Betadine, Anesthetized with 2 ml's 1% Lidocaine. Incised      

      with #11 blade. Drained moderate amount purulent fluid. serosanguinous fluid.               

      Loculations removed. Abscess cavity explored. Packed with iodoform gauze, Dressing:         

      sterile 4x4 gauze, the patient tolerated the procedure well.                                

                                                                                                  

MDM:                                                                                              

21:57 Patient medically screened.                                                             jr8 

22:35 Data reviewed: vital signs, nurses notes, and as a result, I will discharge patient.    jr8 

      Data interpreted: Pulse oximetry: on room air is 98 %. Interpretation: normal.              

      Counseling: I had a detailed discussion with the patient and/or guardian regarding: the     

      historical points, exam findings, and any diagnostic results supporting the                 

      discharge/admit diagnosis, the need for outpatient follow up, a family practitioner, to     

      return to the emergency department if symptoms worsen or persist or if there are any        

      questions or concerns that arise at home.                                                   

                                                                                                  

                                                                                             

22:29 Order name: I\T\D Setup; Complete Time: 22:29                                             jd3

                                                                                                  

Administered Medications:                                                                         

22:28 Drug: Lidocaine (1 %) 5 mg {Note: to bedside of Beto DEGROOT.} Route: Infiltration;          jd3 

22:44 Follow up: Response: No adverse reaction                                                jd3 

                                                                                                  

                                                                                                  

Disposition:                                                                                      

                                                                                             

01:27 Co-signature as Attending Physician, William Mendez MD.                                        chuy 

                                                                                                  

Disposition:                                                                                      

20 22:36 Discharged to Home. Impression: Cutaneous abscess of chest wall.                   

- Condition is Stable.                                                                            

- Discharge Instructions: Skin Abscess, Incision and Drainage.                                    

- Prescriptions for Bactrim - 160 mg Oral Tablet - take 1 tablet by ORAL route              

  every 12 hours for 7 days; 14 tablet.                                                           

- Medication Reconciliation Form, Thank You Letter, Antibiotic Education, Prescription            

  Opioid Use form.                                                                                

- Follow up: Private Physician; When: 48 Hours; Reason: Wound Recheck, Recheck today's            

  complaints, Continuance of care, Re-evaluation by your physician.                               

- Problem is new.                                                                                 

- Symptoms have improved.                                                                         

                                                                                                  

                                                                                                  

                                                                                                  

Signatures:                                                                                       

William Mendez MD MD pkl Roszak, Josh, PA PA   jr8                                                  

Iker Oliveira RN RN   jd3                                                  

Linnea Penny RN RN   ca1                                                  

                                                                                                  

Corrections: (The following items were deleted from the chart)                                    

                                                                                             

22:44 22:36 2020 22:36 Discharged to Home. Impression: Cutaneous abscess of chest wall. jd3 

      Condition is Stable. Forms are Medication Reconciliation Form, Thank You Letter,            

      Antibiotic Education, Prescription Opioid Use. Follow up: Private Physician; When: 48       

      Hours; Reason: Wound Recheck, Recheck today's complaints, Continuance of care,              

      Re-evaluation by your physician. Problem is new. Symptoms have improved. jr8                

                                                                                                  

**************************************************************************************************

## 2020-08-18 NOTE — ER
Nurse's Notes                                                                                     

 [facility]                                                                 

Name: Narayan Lutz                                                                              

Age: 25 yrs                                                                                       

Sex: Male                                                                                         

: 1995                                                                                   

MRN: [id number]                                                                                   

Arrival Date: 2020                                                                          

Time: 21:44                                                                                       

Account#: [id number]                                                                            

Bed 24                                                                                            

Private MD:                                                                                       

Diagnosis: Cutaneous abscess of chest wall                                                        

                                                                                                  

Presentation:                                                                                     

                                                                                             

21:49 Chief complaint: Patient states: abscess on L chest x 3 days. Coronavirus screen:       ca1 

      Client denies travel out of the [place] in the last 14 days. At this time, the client does     

      not indicate any symptoms associated with coronavirus-19. Ebola Screen: Patient             

      negative for fever greater than or equal to 101.5 degrees Fahrenheit, and additional        

      compatible Ebola Virus Disease symptoms Patient denies exposure to infectious person.       

      Patient denies travel to an Ebola-affected area in the 21 days before illness onset. No     

      symptoms or risks identified at this time. Initial Sepsis Screen: Does the patient meet     

      any 2 criteria? No. Patient's initial sepsis screen is negative. Does the patient have      

      a suspected source of infection? No. Patient's initial sepsis screen is negative. Risk      

      Assessment: Do you want to hurt yourself or someone else? Patient reports no desire to      

      harm self or others. Onset of symptoms was 2020.                                 

21:49 Method Of Arrival: Ambulatory                                                           ca1 

21:49 Acuity: AGA 4                                                                           ca1 

                                                                                                  

Historical:                                                                                       

- Allergies:                                                                                      

21:50 No Known Allergies;                                                                     ca1 

- Home Meds:                                                                                      

21:50 None [Active];                                                                          ca1 

- PMHx:                                                                                           

21:50 None;                                                                                   ca1 

- PSHx:                                                                                           

21:50 None;                                                                                   ca1 

                                                                                                  

- Immunization history:: Adult Immunizations up to date, Last tetanus immunization:               

  unknown.                                                                                        

- Social history:: Smoking status: Patient denies any tobacco usage or history of.                

                                                                                                  

                                                                                                  

Screenin:59 Abuse screen: Denies threats or abuse. Nutritional screening: No deficits noted.        jd3 

      Tuberculosis screening: No symptoms or risk factors identified. Fall Risk Ambulatory        

      Aid- None/Bed Rest/Nurse Assist (0 pts). Gait- Normal/Bed Rest/Wheelchair (0 pts)           

      Mental Status- Oriented to own ability (0 pts). Total Jaramillo Fall Scale indicates No         

      Risk (0-24 pts).                                                                            

                                                                                                  

Assessment:                                                                                       

21:58 General: Appears in no apparent distress. comfortable, Behavior is calm, cooperative,   jd3 

      appropriate for age. Pain: Complains of pain in anterior aspect of left upper chest         

      Quality of pain is described as aching, tender. Neuro: Level of Consciousness is awake,     

      alert, obeys commands, Oriented to person, place, time, situation. Cardiovascular:          

      Denies chest pain, Capillary refill < 3 seconds Patient's skin is warm and dry.             

      Respiratory: Airway is patent Respiratory effort is even, unlabored, Respiratory            

      pattern is regular, symmetrical, Denies cough, shortness of breath. GI: No signs and/or     

      symptoms were reported involving the gastrointestinal system. : No signs and/or           

      symptoms were reported regarding the genitourinary system. EENT: No signs and/or            

      symptoms were reported regarding the EENT system. Derm: Skin is intact, Skin is dry,        

      Skin is normal, Skin temperature is warm Wound noted anterior aspect of left upper          

      chest Wound is abscess noted to left upper chest. red, raised, and warm to the touch.       

      Musculoskeletal: Circulation, motion, and sensation intact. Range of motion: intact in      

      all extremities.                                                                            

                                                                                                  

Vital Signs:                                                                                      

21:49  / 87; Pulse 84; Resp 15 S; Temp 98.1(TE); Pulse Ox 98% on R/A; Weight 75.3 kg    ca1 

      (R); Height 5 ft. 7 in. (170.18 cm) (R); Pain 6/10;                                         

21:49 Body Mass Index 26.00 (75.30 kg, 170.18 cm)                                             ca1 

                                                                                                  

ED Course:                                                                                        

21:44 Patient arrived in ED.                                                                  es  

21:50 Triage completed.                                                                       ca1 

21:50 Beto Buck PA is PHCP.                                                               [id number] 

21:50 William Mendez MD is Attending Physician.                                                    [id number] 

21:50 Arm band placed on right wrist.                                                         ca1 

21:53 Iker Oliveira RN is Primary Nurse.                                                  jd3 

21:59 Patient has correct armband on for positive identification. Bed in low position. Call   jd3 

      light in reach. Side rails up X 1. Pulse ox on. NIBP on.                                    

22:29 Assist provider with I \T\ D: of an abscess on left upper chest Set up I\T\D tray.          rolan
3

      Performed by Beto DEGROOT Wound packed. iodoform gauze, Patient tolerated well.           

22:43 IV discontinued, intact, bleeding controlled, No redness/swelling at site. Pressure     jd3 

      dressing applied.                                                                           

                                                                                                  

Administered Medications:                                                                         

22:28 Drug: Lidocaine (1 %) 5 mg {Note: to bedside of Beto DEGROOT.} Route: Infiltration;          jd3 

:44 Follow up: Response: No adverse reaction                                                jd3 

                                                                                                  

                                                                                                  

Outcome:                                                                                          

22:36 Discharge ordered by MD.                                                                crutis 

:43 Discharged to home ambulatory, with family.                                             jd3 

:43 Condition: stable                                                                           

22:43 Discharge instructions given to patient, Instructed on discharge instructions, follow       

      up and referral plans. medication usage, Demonstrated understanding of instructions,        

      follow-up care, medications, Prescriptions given X 1.                                       

:44 Patient left the ED.                                                                    jd3 

                                                                                                  

Signatures:                                                                                       

Peg Webster Josh, PA PA   jrIker Bautista RN                    RN   jLinnea Raza RN                        RN   ca1                                                  

                                                                                                  

**************************************************************************************************